# Patient Record
Sex: FEMALE | Race: WHITE | NOT HISPANIC OR LATINO | Employment: FULL TIME | URBAN - NONMETROPOLITAN AREA
[De-identification: names, ages, dates, MRNs, and addresses within clinical notes are randomized per-mention and may not be internally consistent; named-entity substitution may affect disease eponyms.]

---

## 2018-02-07 ENCOUNTER — OFFICE VISIT (OUTPATIENT)
Dept: CARDIOLOGY | Facility: CLINIC | Age: 23
End: 2018-02-07

## 2018-02-07 VITALS
WEIGHT: 239.8 LBS | DIASTOLIC BLOOD PRESSURE: 77 MMHG | SYSTOLIC BLOOD PRESSURE: 124 MMHG | BODY MASS INDEX: 38.54 KG/M2 | OXYGEN SATURATION: 97 % | HEART RATE: 88 BPM | HEIGHT: 66 IN

## 2018-02-07 DIAGNOSIS — R00.2 PALPITATIONS: ICD-10-CM

## 2018-02-07 DIAGNOSIS — R06.02 SHORTNESS OF BREATH: ICD-10-CM

## 2018-02-07 DIAGNOSIS — R07.9 CHEST PAIN, UNSPECIFIED TYPE: Primary | ICD-10-CM

## 2018-02-07 PROCEDURE — 99204 OFFICE O/P NEW MOD 45 MIN: CPT | Performed by: PHYSICIAN ASSISTANT

## 2018-02-07 RX ORDER — OMEPRAZOLE 20 MG/1
CAPSULE, DELAYED RELEASE ORAL DAILY
COMMUNITY
Start: 2018-01-23

## 2018-02-07 RX ORDER — METFORMIN HYDROCHLORIDE 500 MG/1
TABLET, EXTENDED RELEASE ORAL DAILY
COMMUNITY
Start: 2018-02-01

## 2018-02-07 NOTE — PROGRESS NOTES
Subjective   Florecita Castillo is a 23 y.o. female     Chief Complaint   Patient presents with   • Establish Care     Reported abnl. EKG   • Chest Pain   • Palpitations   • Rapid Heart Rate       HPI    Patient is a 23-year-old female that presents for evaluation.  Patient recently went to see family care provider and it was noted that she was tachycardic.  EKG showed some abnormalities with Q waves in the inferior leads per report.  Patient had been complaining of atypical chest pain and palpitations and was referred to our office for further cardiac evaluation.    Patient describes occasionally feeling discomfort in her chest.  She describes a sharp pain that will occur at random without any precipitating factor and usually resolves spontaneously without any other significant symptoms.  She has very mild dyspnea baseline but nothing that she feels is progressive.  No PND orthopnea.  She will palpitate at times.  She does not complain of presyncope or syncope.  She does have dizziness at times but does not related to palpitations.  Otherwise voices no complaints      Current Outpatient Prescriptions   Medication Sig Dispense Refill   • Ascorbic Acid (VITAMIN C PO) Take  by mouth Daily.     • metFORMIN ER (GLUCOPHAGE-XR) 500 MG 24 hr tablet Daily.     • omeprazole (priLOSEC) 20 MG capsule Daily.     • SPRINTEC 28 0.25-35 MG-MCG per tablet Daily.       No current facility-administered medications for this visit.        Sulfa antibiotics    Past Medical History:   Diagnosis Date   • Chest pain    • PCOS (polycystic ovarian syndrome)    • Tachycardia        Social History     Social History   • Marital status:      Spouse name: N/A   • Number of children: N/A   • Years of education: N/A     Occupational History   • Not on file.     Social History Main Topics   • Smoking status: Current Every Day Smoker     Types: Cigarettes   • Smokeless tobacco: Never Used      Comment: smokes close to a PPD since age 17   •  "Alcohol use No   • Drug use: No   • Sexual activity: Not on file     Other Topics Concern   • Not on file     Social History Narrative   • No narrative on file           Family History   Problem Relation Age of Onset   • Hypertension Mother    • No Known Problems Father    • Stroke Maternal Grandmother    • Heart attack Paternal Grandfather        Review of Systems   Constitutional: Negative.    HENT: Positive for ear pain.    Eyes: Positive for visual disturbance (has glasses).   Respiratory: Positive for shortness of breath.    Cardiovascular: Positive for chest pain, palpitations and leg swelling (feet/hands).   Gastrointestinal: Negative.    Endocrine: Negative.    Genitourinary: Negative.    Musculoskeletal: Negative.    Skin: Negative.    Allergic/Immunologic: Negative.    Neurological: Negative.    Hematological: Negative.    Psychiatric/Behavioral: Negative.        Objective   Vitals:    02/07/18 1428   BP: 124/77   BP Location: Left arm   Patient Position: Sitting   Pulse: 88   SpO2: 97%   Weight: 109 kg (239 lb 12.8 oz)   Height: 166.6 cm (65.6\")      /77 (BP Location: Left arm, Patient Position: Sitting)  Pulse 88  Ht 166.6 cm (65.6\")  Wt 109 kg (239 lb 12.8 oz)  SpO2 97%  BMI 39.18 kg/m2    Lab Results (most recent)     None          Physical Exam   Constitutional: She is oriented to person, place, and time. She appears well-developed and well-nourished. No distress.   HENT:   Head: Normocephalic and atraumatic.   Eyes: EOM are normal. Pupils are equal, round, and reactive to light.   Neck: No JVD present.   Cardiovascular: Normal rate, regular rhythm and normal heart sounds.  Exam reveals no gallop and no friction rub.    No murmur heard.  Pulmonary/Chest: Effort normal and breath sounds normal. No respiratory distress. She has no wheezes. She has no rales. She exhibits no tenderness.   Abdominal: Soft.   Musculoskeletal: Normal range of motion. She exhibits no edema.   Neurological: She is " alert and oriented to person, place, and time. No cranial nerve deficit.   Skin: Skin is warm and dry. No rash noted. No erythema. No pallor.   Psychiatric: She has a normal mood and affect. Her behavior is normal.   Nursing note and vitals reviewed.      Procedure   Procedures         Assessment/Plan     Problems Addressed this Visit        Cardiovascular and Mediastinum    Palpitations    Relevant Orders    Treadmill Stress Test    Adult Transthoracic Echo Complete W/ Cont if Necessary Per Protocol    Holter Monitor - 24 Hour       Respiratory    Shortness of breath    Relevant Orders    Treadmill Stress Test    Adult Transthoracic Echo Complete W/ Cont if Necessary Per Protocol    Holter Monitor - 24 Hour       Nervous and Auditory    Chest pain - Primary    Relevant Orders    Treadmill Stress Test    Adult Transthoracic Echo Complete W/ Cont if Necessary Per Protocol    Holter Monitor - 24 Hour              Recommendation  1.  Patient will be scheduled for cardiac workup to evaluate.  Symptoms appear atypical but we will schedule for echocardiogram to evaluate normal LV function and rule out congenital heart disease.  24-hour Holter monitor to look for any arrhythmic substrate.  Regular treadmill stress test will be ordered as well.  We will see her back for follow-up of above testing.  Follow-up primary as scheduled           Electronically signed by:

## 2018-02-18 ENCOUNTER — OUTSIDE FACILITY SERVICE (OUTPATIENT)
Dept: CARDIOLOGY | Facility: CLINIC | Age: 23
End: 2018-02-18

## 2018-02-18 PROCEDURE — 93227 XTRNL ECG REC<48 HR R&I: CPT | Performed by: INTERNAL MEDICINE
